# Patient Record
Sex: FEMALE | Race: WHITE | NOT HISPANIC OR LATINO | URBAN - METROPOLITAN AREA
[De-identification: names, ages, dates, MRNs, and addresses within clinical notes are randomized per-mention and may not be internally consistent; named-entity substitution may affect disease eponyms.]

---

## 2020-07-13 ENCOUNTER — OFFICE (OUTPATIENT)
Dept: URBAN - METROPOLITAN AREA TELEHEALTH 10 | Facility: TELEHEALTH | Age: 30
End: 2020-07-13

## 2020-07-13 DIAGNOSIS — R10.13 EPIGASTRIC PAIN: ICD-10-CM

## 2020-07-13 NOTE — SERVICEHPINOTES
Ms. 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  About 2 months ago, she began to have a sensation that her "whole GI tract was burning.  She describes this as a diffuse abdominal burning that radiates up into her chest.  She admits to increased stress during.  [employer].  In the past, she has made an assessment of having gluten, corn and dairy sensitivity.  She treated herself with digestive enzymes which helped for some period of time.  More recently, she tried a diet which involves trying to minimize mixing carbohydrates and protein at the same time.  She states her symptoms are much improved sheet consumes only rice and vegetables.States her bowel movements have changed during this time she has used she is having 1-2 stools per day with varying consistency but frequently mildly loose.  She does have occasionally very dark, but not melenic stools.  She has noted bowel movements with undigested food.She has had no response to empiric course of pantoprazole for 2 weeks.Anne has had a consultation with a gastroenterologist was recommended endoscopic evaluation.  Patient states she has not had any blood in her stool studies done.Anne wants to compare the cost of having her evaluation in [place] versus returning home.I told her there is extensive differential diagnosis but I would recommend getting some routine labs 1st.  I gave her list which she will take to the gastroenterologist in [place] before making a decision on further evaluation.

## 2022-10-18 ENCOUNTER — OFFICE (OUTPATIENT)
Dept: URBAN - METROPOLITAN AREA CLINIC 19 | Facility: CLINIC | Age: 32
End: 2022-10-18

## 2022-10-18 VITALS
SYSTOLIC BLOOD PRESSURE: 97 MMHG | HEIGHT: 67 IN | HEART RATE: 70 BPM | WEIGHT: 155 LBS | OXYGEN SATURATION: 98 % | DIASTOLIC BLOOD PRESSURE: 57 MMHG

## 2022-10-18 DIAGNOSIS — R10.11 RIGHT UPPER QUADRANT PAIN: ICD-10-CM

## 2022-10-18 DIAGNOSIS — K59.00 CONSTIPATION, UNSPECIFIED: ICD-10-CM

## 2022-10-18 PROCEDURE — 99214 OFFICE O/P EST MOD 30 MIN: CPT | Performed by: INTERNAL MEDICINE
